# Patient Record
Sex: FEMALE
[De-identification: names, ages, dates, MRNs, and addresses within clinical notes are randomized per-mention and may not be internally consistent; named-entity substitution may affect disease eponyms.]

---

## 2019-03-16 ENCOUNTER — HOSPITAL ENCOUNTER (EMERGENCY)
Dept: HOSPITAL 97 - ER | Age: 34
Discharge: HOME | End: 2019-03-16
Payer: COMMERCIAL

## 2019-03-16 DIAGNOSIS — K52.9: Primary | ICD-10-CM

## 2019-03-16 LAB
ALBUMIN SERPL BCP-MCNC: 3.8 G/DL (ref 3.4–5)
ALP SERPL-CCNC: 54 U/L (ref 45–117)
ALT SERPL W P-5'-P-CCNC: 21 U/L (ref 12–78)
AST SERPL W P-5'-P-CCNC: 13 U/L (ref 15–37)
BUN BLD-MCNC: 5 MG/DL (ref 7–18)
GLUCOSE SERPLBLD-MCNC: 97 MG/DL (ref 74–106)
HCT VFR BLD CALC: 36.8 % (ref 36–45)
LIPASE SERPL-CCNC: 101 U/L (ref 73–393)
LYMPHOCYTES # SPEC AUTO: 1.4 K/UL (ref 0.7–4.9)
PMV BLD: 8.5 FL (ref 7.6–11.3)
POTASSIUM SERPL-SCNC: 3.4 MMOL/L (ref 3.5–5.1)
RBC # BLD: 4.01 M/UL (ref 3.86–4.86)

## 2019-03-16 PROCEDURE — 96365 THER/PROPH/DIAG IV INF INIT: CPT

## 2019-03-16 PROCEDURE — 81003 URINALYSIS AUTO W/O SCOPE: CPT

## 2019-03-16 PROCEDURE — 80048 BASIC METABOLIC PNL TOTAL CA: CPT

## 2019-03-16 PROCEDURE — 96375 TX/PRO/DX INJ NEW DRUG ADDON: CPT

## 2019-03-16 PROCEDURE — 83690 ASSAY OF LIPASE: CPT

## 2019-03-16 PROCEDURE — 99284 EMERGENCY DEPT VISIT MOD MDM: CPT

## 2019-03-16 PROCEDURE — 96361 HYDRATE IV INFUSION ADD-ON: CPT

## 2019-03-16 PROCEDURE — 74177 CT ABD & PELVIS W/CONTRAST: CPT

## 2019-03-16 PROCEDURE — 85025 COMPLETE CBC W/AUTO DIFF WBC: CPT

## 2019-03-16 PROCEDURE — 36415 COLL VENOUS BLD VENIPUNCTURE: CPT

## 2019-03-16 PROCEDURE — 81025 URINE PREGNANCY TEST: CPT

## 2019-03-16 PROCEDURE — 80076 HEPATIC FUNCTION PANEL: CPT

## 2019-03-16 NOTE — RAD REPORT
EXAM DESCRIPTION:  CTAbdomen   Pelvis W Contrast - 3/16/2019 2:34 pm

 

CLINICAL HISTORY:  Abdominal pain.

IV ONLY, abdominal pain, bloody stools

 

COMPARISON:  No comparisons

 

TECHNIQUE:  Biphasic CT imaging of the abdomen and pelvis was performed with 100 ml non-ionic IV cont
rast.

 

All CT scans are performed using dose optimization technique as appropriate and may include automated
 exposure control or mA/KV adjustment according to patient size.

 

FINDINGS:  The lung bases are clear.

 

The liver contains an 8 mm hypodensity in the left lobe, likely benign. The spleen, pancreas, adrenal
 glands and kidneys are within normal limits.

 

No bowel obstruction, free air, intra-abdominal free fluid or abscess. Moderately severe inflammatory
 wall thickening and edema is seen involving the ascending colon. No pneumatosis coli seen. Small sara
unt of free fluid is seen in the pelvis. The appendix is normal.  No evidence of significant lymphade
nopathy.

 

No suspicious bony findings.

 

IMPRESSION:  Moderately severe ascending colitis.

## 2019-03-16 NOTE — EDPHYS
Physician Documentation                                                                           

 [facility]                                                                

Name: Estela Allen                                                                       

Age: 33 yrs                                                                                       

Sex: Female                                                                                       

: 1985                                                                                   

MRN: [id number]                                                                                   

Arrival Date: 2019                                                                          

Time: 12:16                                                                                       

Account#: [id number]                                                                            

Bed 5                                                                                             

Private MD:                                                                                       

ED Physician Reese Damon                                                                         

HPI:                                                                                              

                                                                                             

13:26 This 33 yrs old  Female presents to ER via Ambulatory with complaints of Bloody jmm 

      Stools, Abdominal Cramping.                                                                 

13:26 The patient presents with abdominal pain.                                               jmm 

13:26 Onset: The symptoms/episode began/occurred gradually, 1 day(s) ago.                     jmm 

13:26 The symptoms do not radiate. Associated signs and symptoms: Pertinent positives: blood  jmm 

      in stools. This is a 33 year old female with no chronic medical conditions that             

      presents to the ED with complaints of abdominal pain, a blood in her stools beginning       

      approx 1 day ago. Patient states she recently returned on a trip from [place]. Denies        

      recent antibiotics use.                                                                     

                                                                                                  

OB/GYN:                                                                                           

12:29 LMP 3/5/2019                                                                            aa5 

                                                                                                  

Historical:                                                                                       

- Allergies:                                                                                      

12:29 No Known Allergies;                                                                     aa5 

- PMHx:                                                                                           

12:29 None;                                                                                   aa5 

- PSHx:                                                                                           

12:29 None;                                                                                   aa5 

                                                                                                  

- Immunization history:: Flu vaccine is up to date.                                               

- Social history:: Smoking status: Patient/guardian denies using tobacco.                         

- Ebola Screening: : No symptoms or risks identified at this time Patient reports                 

  travel to an Ebola-affected area in the 21 days before illness onset. Patient reports           

  to have traveled to [place] from  the  to Monday the .                             

                                                                                                  

                                                                                                  

ROS:                                                                                              

13:26 Constitutional: Negative for fever, chills, and weight loss, Cardiovascular: Negative   jmm 

      for chest pain, palpitations, and edema, Respiratory: Negative for shortness of breath,     

      cough, wheezing, and pleuritic chest pain.                                                  

13:26 Abdomen/GI: Positive for abdominal pain, diarrhea, rectal bleeding.                         

13:26 All other systems are negative.                                                             

                                                                                                  

Exam:                                                                                             

13:26 Constitutional:  This is a well developed, well nourished patient who is awake, alert,  jmm 

      and in no acute distress. Head/Face:  atraumatic. Eyes:  EOMI, no conjunctival erythema     

      appreciated ENT:  Moist Mucus Membranes Neck:  Trachea midline, Supple Chest/axilla:        

      Normal chest wall appearance and motion.   Cardiovascular:  Regular rate and rhythm.        

      No edema appreciated Respiratory:  Normal respirations, no respiratory distress             

      appreciated                                                                                 

13:26 Neuro:  Awake and alert, normal gait                                                        

13:26 Abdomen/GI: Inspection: abdomen appears normal, Bowel sounds: normal, Palpation: soft,      

      moderate abdominal tenderness, in the right upper quadrant and right lower quadrant.        

13:26 Back: ROM is normal.                                                                        

13:26 Musculoskeletal/extremity: ROM: intact in all extremities.                                  

13:26 Skin: Appearance: Color: normal in color.                                                   

13:26 Psych: Behavior/mood is pleasant, cooperative.                                              

                                                                                                  

Vital Signs:                                                                                      

12:29  / 82; Pulse 102; Resp 18 S; Temp 98.7(TE); Pulse Ox 99% on R/A; Weight 48.08 kg  aa5 

      (R); Height 5 ft. 1 in. (154.94 cm) (R); Pain 8/10;                                         

13:21  / 78; Pulse 95; Resp 14; Pulse Ox 100% on R/A; Pain 3/10;                        pc1 

16:00  / 75; Pulse 77; Resp 18; Pulse Ox 100% on R/A;                                   pc1 

17:57  / 76; Pulse 72; Resp 18; Pulse Ox 100% on R/A;                                   hj  

12:29 Body Mass Index 20.03 (48.08 kg, 154.94 cm)                                             aa5 

                                                                                                  

MDM:                                                                                              

13:26 Patient medically screened.                                                             [facility] 

17:50 Data reviewed: vital signs, nurses notes. Counseling: I had a detailed discussion with  lashell 

      the patient and/or guardian regarding: the historical points, exam findings, and any        

      diagnostic results supporting the discharge/admit diagnosis, lab results, radiology         

      results, the need for outpatient follow up, to return to the emergency department if        

      symptoms worsen or persist or if there are any questions or concerns that arise at          

      home. ED course: Patient advised to follow up with gi or pcp for reevaluation. patient      

      is given strict return precautions. patient understood and agrees with the plan of          

      care. .                                                                                     

                                                                                                  

                                                                                             

13:30 Order name: Basic Metabolic Panel; Complete Time: 14:20                                 [facility] 

                                                                                             

13:30 Order name: CBC with Diff; Complete Time: 14:20                                         [facility] 

                                                                                             

13:30 Order name: Creatinine for Radiology; Complete Time: 14:20                              [facility] 

                                                                                             

13:30 Order name: Hepatic Function; Complete Time: 14:20                                      [facility] 

                                                                                             

13:30 Order name: Lipase; Complete Time: 14:20                                                [facility] 

                                                                                             

13:34 Order name: Urine Dipstick--Ancillary (enter results); Complete Time: 13:44               

                                                                                             

13:30 Order name: CT Abd/Pelvis - W/Contrast; Complete Time: 14:49                            [facility] 

                                                                                             

13:34 Order name: Urine Pregnancy--Ancillary (enter results); Complete Time: 13:44              

                                                                                             

13:30 Order name: IV Saline Lock; Complete Time: 13:47                                        [facility] 

                                                                                             

13:30 Order name: Labs collected and sent; Complete Time: 13:58                               [facility] 

                                                                                                  

Administered Medications:                                                                         

13:40 Drug: NS 0.9% 1000 ml Route: IV; Rate: 1 bolus; Site: right antecubital;                hj  

15:30 Follow up: IV Status: Completed infusion                                                hj  

13:40 Drug: morphine 2 mg Route: IVP; Site: right antecubital;                                hj  

15:00 Follow up: Response: No adverse reaction                                                hj  

13:40 Drug: Zofran 4 mg Route: IVP; Site: right antecubital;                                  hj  

15:00 Follow up: Response: No adverse reaction; Pain is decreased                             hj  

14:53 Drug: Flagyl 500 mg Volume: 100 ml; Route: IVPB; Rate: 200 ml/hr; Infused Over: 30      hj  

      mins; Site: right antecubital;                                                              

16:00 Follow up: IV Status: Completed infusion                                                hj  

15:35 Drug: LevaQUIN 750 mg Volume: 150 ml; Route: IVPB; Infused Over: 90 mins; Site: right   hj  

      antecubital;                                                                                

16:10 Follow up: IV Status: Completed infusion                                                hj  

16:41 Drug: morphine 2 mg Route: IVP; Site: right antecubital;                                hj  

16:46 Follow up: Response: No adverse reaction; Pain is decreased                             hj  

                                                                                                  

                                                                                                  

Disposition:                                                                                      

                                                                                             

09:44 Co-signature as Attending Physician, Reese Damon MD.                                    rn  

                                                                                                  

Disposition:                                                                                      

19 17:51 Discharged to Home. Impression: Colitis.                                           

- Condition is Stable.                                                                            

- Discharge Instructions: Colitis.                                                                

- Prescriptions for Flagyl 500 mg Oral Tablet - take 1 tablet by ORAL route every 8               

  hours for 10 days; 30 tablet. Levaquin 750 mg Oral Tablet - take 1 tablet by ORAL               

  route once daily for 10 days; 10 tablet. Tylenol- Codeine #3 300-30 mg Oral Tablet -            

  take 1 tablet by ORAL route every 6 hours As needed; 20 tablet.                                 

- Medication Reconciliation Form, Thank You Letter, Antibiotic Education, Prescription            

  Opioid Use form.                                                                                

- Follow up: Gregg Bar MD; When: 2 - 3 days; Reason: Recheck today's complaints,           

  Continuance of care, Re-evaluation by your physician.                                           

                                                                                                  

                                                                                                  

                                                                                                  

Signatures:                                                                                       

Dispatcher [employer]                           EDMS                                                 

Juan Palbo Diaz PA PA jmm Nieto, Roman, MD MD   rn                                                   

Nikky Newman RN                     RN   aa5                                                  

Moses Foss, RN                      RN   hj Cantu, Patrick pc1                                                  

                                                                                                  

Corrections: (The following items were deleted from the chart)                                    

                                                                                             

18:00 17:51 2019 17:51 Discharged to Home. Impression: Colitis. Condition is Stable.    hj  

      Forms are Medication Reconciliation Form, Thank You Letter, Antibiotic Education,           

      Prescription Opioid Use. Follow up: Gregg Bar; When: 2 - 3 days; Reason: Recheck       

      today's complaints, Continuance of care, Re-evaluation by your physician. lashell               

                                                                                                  

**************************************************************************************************

## 2019-03-16 NOTE — ER
Nurse's Notes                                                                                     

 [facility]                                                                

Name: Estela Allen                                                                       

Age: 33 yrs                                                                                       

Sex: Female                                                                                       

: 1985                                                                                   

MRN: [id number]                                                                                   

Arrival Date: 2019                                                                          

Time: 12:16                                                                                       

Account#: [id number]                                                                            

Bed 5                                                                                             

Private MD:                                                                                       

Diagnosis: Colitis                                                                                

                                                                                                  

Presentation:                                                                                     

                                                                                             

12:28 Presenting complaint: Patient states: upper abd cramping that radiates to lower abdomen aa5 

      since yesterday. Pt reports nausea and diarrhea with dark red blood that began              

      yesterday. Pt denies vomiting. Transition of care: patient was not received from            

      another setting of care. Onset of symptoms was 2019. Risk Assessment: Do you want     

      to hurt yourself or someone else? Patient reports no desire to harm self or others.         

      Initial Sepsis Screen: Does the patient meet any 2 criteria? No. Patient's initial          

      sepsis screen is negative. Does the patient have a suspected source of infection? No.       

      Patient's initial sepsis screen is negative. Care prior to arrival: None.                   

12:28 Method Of Arrival: Ambulatory                                                           aa5 

12:28 Acuity: [place]                                                                           aa5 

13:24 Care prior to arrival: Medication(s) given: 200mg Ibuprofen at home.                    pc1 

                                                                                                  

Triage Assessment:                                                                                

13:14 General: Appears in no apparent distress. uncomfortable, Behavior is calm, cooperative, hj  

      appropriate for age. Pain: Complains of pain in abdomen. GI: Reports lower abdominal        

      pain, upper abdominal pain, bloody stool, nausea, vomiting.                                 

                                                                                                  

OB/GYN:                                                                                           

12:29 LMP 3/5/2019                                                                            aa5 

                                                                                                  

Historical:                                                                                       

- Allergies:                                                                                      

12:29 No Known Allergies;                                                                     aa5 

- PMHx:                                                                                           

12:29 None;                                                                                   aa5 

- PSHx:                                                                                           

12:29 None;                                                                                   aa5 

                                                                                                  

- Immunization history:: Flu vaccine is up to date.                                               

- Social history:: Smoking status: Patient/guardian denies using tobacco.                         

- Ebola Screening: : No symptoms or risks identified at this time Patient reports                 

  travel to an Ebola-affected area in the 21 days before illness onset. Patient reports           

  to have traveled to [place] from  the  to Monday the .                             

                                                                                                  

                                                                                                  

Screenin:14 Abuse screen: Denies threats or abuse. Denies injuries from another. Nutritional        hj  

      screening: No deficits noted. Tuberculosis screening: No symptoms or risk factors           

      identified. Fall Risk None identified.                                                      

                                                                                                  

Assessment:                                                                                       

13:15 General: Appears in no apparent distress. uncomfortable, Behavior is calm, cooperative, hj  

      appropriate for age. Pain: Complains of pain in abdomen. Neuro: Level of Consciousness      

      is awake, alert, obeys commands, Oriented to person, place, time, situation,                

      Appropriate for age. Cardiovascular: Capillary refill < 3 seconds Patient's skin is         

      warm and dry. Respiratory: Airway is patent Respiratory effort is even, unlabored,          

      Respiratory pattern is regular, symmetrical. GI: Reports lower abdominal pain, upper        

      abdominal pain, bloody stool, nausea, vomiting. : No signs and/or symptoms were           

      reported regarding the genitourinary system. EENT: No signs and/or symptoms were            

      reported regarding the EENT system. Derm: No signs and/or symptoms reported regarding       

      the dermatologic system. Musculoskeletal: No signs and/or symptoms reported regarding       

      the musculoskeletal system.                                                                 

14:30 Reassessment: Patient and/or family updated on plan of care and expected duration. Pain hj  

      level reassessed. Patient is alert, oriented x 3, equal unlabored respirations, skin        

      warm/dry/pink. awaiting results and POC:.                                                   

15:30 Reassessment: Patient and/or family updated on plan of care and expected duration. Pain hj  

      level reassessed. Patient is alert, oriented x 3, equal unlabored respirations, skin        

      warm/dry/pink. Patient states feeling better.                                               

16:48 Reassessment: Patient and/or family updated on plan of care and expected duration. Pain hj  

      level reassessed. Patient is alert, oriented x 3, equal unlabored respirations, skin        

      warm/dry/pink. medicated.                                                                   

                                                                                                  

Vital Signs:                                                                                      

12:29  / 82; Pulse 102; Resp 18 S; Temp 98.7(TE); Pulse Ox 99% on R/A; Weight 48.08 kg  aa5 

      (R); Height 5 ft. 1 in. (154.94 cm) (R); Pain 8/10;                                         

13:21  / 78; Pulse 95; Resp 14; Pulse Ox 100% on R/A; Pain 3/10;                        pc1 

16:00  / 75; Pulse 77; Resp 18; Pulse Ox 100% on R/A;                                   pc1 

17:57  / 76; Pulse 72; Resp 18; Pulse Ox 100% on R/A;                                   hj  

12:29 Body Mass Index 20.03 (48.08 kg, 154.94 cm)                                             aa5 

                                                                                                  

ED Course:                                                                                        

12:16 Patient arrived in ED.                                                                  mr  

12:28 Arm band placed on.                                                                     aa5 

12:29 Triage completed.                                                                       aa 

13:12 Moses Foss, RN is Primary Nurse.                                                    hj  

13:15 Patient has correct armband on for positive identification. Placed in gown. Bed in low  hj  

      position. Call light in reach. Side rails up X 1. Adult w/ patient.                         

13:17 Juan Pablo Diaz PA is PHCP.                                                              [facility] 

13:17 Reese Damon MD is Attending Physician.                                                [facility] 

13:40 Initial lab(s) drawn, by me, sent to lab. Inserted saline lock: 22 gauge in right       hj  

      antecubital area, using aseptic technique. Blood collected.                                 

14:33 CT completed. Patient tolerated procedure well. Patient moved back from CT.             bq  

14:34 CT Abd/Pelvis - W/Contrast In Process Unspecified.                                      EDMS

17:51 Gregg Bar MD is Referral Physician.                                              [facility] 

17:56 No provider procedures requiring assistance completed. IV discontinued, intact,         hj  

      bleeding controlled, No redness/swelling at site. Pressure dressing applied.                

                                                                                                  

Administered Medications:                                                                         

13:40 Drug: NS 0.9% 1000 ml Route: IV; Rate: 1 bolus; Site: right antecubital;                hj  

15:30 Follow up: IV Status: Completed infusion                                                hj  

13:40 Drug: morphine 2 mg Route: IVP; Site: right antecubital;                                hj  

15:00 Follow up: Response: No adverse reaction                                                hj  

13:40 Drug: Zofran 4 mg Route: IVP; Site: right antecubital;                                  hj  

15:00 Follow up: Response: No adverse reaction; Pain is decreased                             hj  

14:53 Drug: Flagyl 500 mg Volume: 100 ml; Route: IVPB; Rate: 200 ml/hr; Infused Over: 30      hj  

      mins; Site: right antecubital;                                                              

16:00 Follow up: IV Status: Completed infusion                                                hj  

15:35 Drug: LevaQUIN 750 mg Volume: 150 ml; Route: IVPB; Infused Over: 90 mins; Site: right   hj  

      antecubital;                                                                                

16:10 Follow up: IV Status: Completed infusion                                                hj  

16:41 Drug: morphine 2 mg Route: IVP; Site: right antecubital;                                hj  

16:46 Follow up: Response: No adverse reaction; Pain is decreased                               

                                                                                                  

                                                                                                  

Outcome:                                                                                          

17:51 Discharge ordered by MD.                                                                lashell 

17:56 Discharged to home ambulatory, with family.                                             bin  

17:56 Condition: stable                                                                           

17:56 Discharge instructions given to patient, family, Instructed on discharge instructions,      

      follow up and referral plans. medication usage, Demonstrated understanding of               

      instructions, follow-up care, medications, Prescriptions given X 2.                         

18:00 Patient left the ED.                                                                    bin  

                                                                                                  

Signatures:                                                                                       

Dispatcher [employer]                           EDMS                                                 

Juan Pablo Diaz PA PA jmm Rivera, Mary                                 mr                                                   

DrissdeisiBecky Audri, RN                     RN   aa5                                                  

Moses Foss RN                      RN   hj Cantu, Patrick                               pc1                                                  

                                                                                                  

**************************************************************************************************

## 2019-03-17 ENCOUNTER — HOSPITAL ENCOUNTER (OUTPATIENT)
Dept: HOSPITAL 97 - ER | Age: 34
Setting detail: OBSERVATION
LOS: 2 days | Discharge: HOME | End: 2019-03-19
Attending: HOSPITALIST | Admitting: HOSPITALIST
Payer: COMMERCIAL

## 2019-03-17 DIAGNOSIS — K52.9: Primary | ICD-10-CM

## 2019-03-17 DIAGNOSIS — K76.9: ICD-10-CM

## 2019-03-17 LAB
ALBUMIN SERPL BCP-MCNC: 4.1 G/DL (ref 3.4–5)
ALP SERPL-CCNC: 55 U/L (ref 45–117)
ALT SERPL W P-5'-P-CCNC: 20 U/L (ref 12–78)
AST SERPL W P-5'-P-CCNC: 12 U/L (ref 15–37)
BUN BLD-MCNC: 6 MG/DL (ref 7–18)
GLUCOSE SERPLBLD-MCNC: 108 MG/DL (ref 74–106)
HCT VFR BLD CALC: 41.2 % (ref 36–45)
LIPASE SERPL-CCNC: 100 U/L (ref 73–393)
LYMPHOCYTES # SPEC AUTO: 0.7 K/UL (ref 0.7–4.9)
PMV BLD: 8.3 FL (ref 7.6–11.3)
POTASSIUM SERPL-SCNC: 3.3 MMOL/L (ref 3.5–5.1)
RBC # BLD: 4.5 M/UL (ref 3.86–4.86)

## 2019-03-17 PROCEDURE — 96361 HYDRATE IV INFUSION ADD-ON: CPT

## 2019-03-17 PROCEDURE — 89055 LEUKOCYTE ASSESSMENT FECAL: CPT

## 2019-03-17 PROCEDURE — 80048 BASIC METABOLIC PNL TOTAL CA: CPT

## 2019-03-17 PROCEDURE — 83690 ASSAY OF LIPASE: CPT

## 2019-03-17 PROCEDURE — 36415 COLL VENOUS BLD VENIPUNCTURE: CPT

## 2019-03-17 PROCEDURE — 96365 THER/PROPH/DIAG IV INF INIT: CPT

## 2019-03-17 PROCEDURE — 81025 URINE PREGNANCY TEST: CPT

## 2019-03-17 PROCEDURE — 87045 FECES CULTURE AEROBIC BACT: CPT

## 2019-03-17 PROCEDURE — 80076 HEPATIC FUNCTION PANEL: CPT

## 2019-03-17 PROCEDURE — 80053 COMPREHEN METABOLIC PANEL: CPT

## 2019-03-17 PROCEDURE — 81003 URINALYSIS AUTO W/O SCOPE: CPT

## 2019-03-17 PROCEDURE — 87046 STOOL CULTR AEROBIC BACT EA: CPT

## 2019-03-17 PROCEDURE — 87088 URINE BACTERIA CULTURE: CPT

## 2019-03-17 PROCEDURE — 87493 C DIFF AMPLIFIED PROBE: CPT

## 2019-03-17 PROCEDURE — 99285 EMERGENCY DEPT VISIT HI MDM: CPT

## 2019-03-17 PROCEDURE — 87040 BLOOD CULTURE FOR BACTERIA: CPT

## 2019-03-17 PROCEDURE — 84703 CHORIONIC GONADOTROPIN ASSAY: CPT

## 2019-03-17 PROCEDURE — 85025 COMPLETE CBC W/AUTO DIFF WBC: CPT

## 2019-03-17 PROCEDURE — 84100 ASSAY OF PHOSPHORUS: CPT

## 2019-03-17 PROCEDURE — 96375 TX/PRO/DX INJ NEW DRUG ADDON: CPT

## 2019-03-17 PROCEDURE — 87086 URINE CULTURE/COLONY COUNT: CPT

## 2019-03-17 PROCEDURE — 83735 ASSAY OF MAGNESIUM: CPT

## 2019-03-17 PROCEDURE — 80074 ACUTE HEPATITIS PANEL: CPT

## 2019-03-17 PROCEDURE — 81015 MICROSCOPIC EXAM OF URINE: CPT

## 2019-03-17 NOTE — ER
Nurse's Notes                                                                                     

 [facility]                                                                

Name: Estela Allen                                                                       

Age: 33 yrs                                                                                       

Sex: Female                                                                                       

: 1985                                                                                   

MRN: [id number]                                                                                   

Arrival Date: 2019                                                                          

Time: 21:40                                                                                       

Account#: [id number]                                                                            

Bed 28                                                                                            

Private MD:                                                                                       

Diagnosis: Left sided colitis with complications-failed outpt therapy;Dehydration                 

                                                                                                  

Presentation:                                                                                     

                                                                                             

22:06 Presenting complaint: Patient states: was seen here yesterday and diagnosed with        bb  

      Colitis but today is vomiting and can't keep anything down, still having abdominal          

      pain. Transition of care: patient was not received from another setting of care. Onset      

      of symptoms was March 15, 2019. Risk Assessment: Do you want to hurt yourself or            

      someone else? Patient reports no desire to harm self or others. Initial Sepsis Screen:      

      Does the patient meet any 2 criteria? No. Patient's initial sepsis screen is negative.      

      Does the patient have a suspected source of infection? No. Patient's initial sepsis         

      screen is negative. Care prior to arrival: None.                                            

22:06 Method Of Arrival: Ambulatory                                                           bb  

22:06 Acuity: NOLA 3                                                                           bb  

                                                                                                  

OB/GYN:                                                                                           

22:08 LMP 2019                                                                           bb  

                                                                                                  

Historical:                                                                                       

- Allergies:                                                                                      

22:08 No Known Allergies;                                                                     bb  

- Home Meds:                                                                                      

22:08 birth control pill [Active];                                                            bb  

- PMHx:                                                                                           

22:08 colitis;                                                                                bb  

- PSHx:                                                                                           

22:08 None;                                                                                   bb  

                                                                                                  

- Immunization history:: Adult Immunizations up to date.                                          

- Social history:: Smoking status: Patient/guardian denies using tobacco.                         

- Ebola Screening: : No symptoms or risks identified at this time.                                

                                                                                                  

                                                                                                  

Screenin:39 Abuse screen: Denies threats or abuse. Nutritional screening: No deficits noted.        la1 

      Tuberculosis screening: No symptoms or risk factors identified. Fall Risk None              

      identified.                                                                                 

                                                                                                  

Assessment:                                                                                       

22:38 General: Appears in no apparent distress. Behavior is calm, cooperative. Pain:          la1 

      Complains of pain in right upper quadrant, left upper quadrant, right lower quadrant        

      and left lower quadrant. Neuro: Level of Consciousness is awake, alert, obeys commands,     

      Oriented to person, place, time, situation. Cardiovascular: Capillary refill < 3            

      seconds Patient's skin is warm and dry. Respiratory: Airway is patent Respiratory           

      effort is even, unlabored, Respiratory pattern is regular, symmetrical, Breath sounds       

      are clear bilaterally. GI: Abdomen is round non-distended, Bowel sounds present X 4         

      quads. Abd is soft X 4 quads Reports diarrhea, nausea, vomiting. : No signs and/or        

      symptoms were reported regarding the genitourinary system.                                  

                                                                                                  

Vital Signs:                                                                                      

22:08  / 76; Pulse 98; Resp 16 S; Temp 98.2(O); Pulse Ox 99% on R/A; Weight 48.08 kg    bb  

      (R); Height 5 ft. 1 in. (154.94 cm) (R); Pain 4/10;                                         

23:38  / 77; Pulse 87; Resp 18; Pulse Ox 98% on R/A;                                    la1 

                                                                                             

00:56  / 71; Pulse 85; Resp 18; Pulse Ox 98% on R/A;                                    la1 

                                                                                             

22:08 Body Mass Index 20.03 (48.08 kg, 154.94 cm)                                             bb  

                                                                                                  

ED Course:                                                                                        

                                                                                             

21:40 Patient arrived in ED.                                                                  am2 

22:05 [facility]                                                        snw 

22:05 Quintin Flores MD is Attending Physician.                                              snw 

22:08 Triage completed.                                                                       bb  

22:08 Arm band placed on Patient placed in waiting room, Patient notified of wait time.       bb  

      Family accompanied patient.                                                                 

22:29 Tom Acosta, RN is Primary Nurse.                                                       la1 

22:30 Placed in gown. Bed in low position. Call light in reach. Side rails up X 1. Side rails jp3 

      up X2. Warm blanket given. Pillow given.                                                    

22:30 Pulse ox on. NIBP on.                                                                   jp3 

22:30 Urine collected: clean catch specimen, clear, gayle colored, Amount Voided: 100mL.      jp3 

22:45 Initial lab(s) drawn, by me, sent to lab. Inserted saline lock: 22 gauge in left        jp3 

      antecubital area, using aseptic technique. Blood collected.                                 

22:45 First set of blood cultures drawn via 22-gauge IV in LAC.                               jp3 

23:00 Second set of blood cultures drawn via 21-gauge butterfly needle in RAC.                jp3 

23:24 Jessie Rose MD is Hospitalizing Provider.                                           snw 

                                                                                             

01:32 No provider procedures requiring assistance completed. Patient admitted, IV remains in  la1 

      place.                                                                                      

                                                                                                  

Administered Medications:                                                                         

                                                                                             

22:54 Drug: NS 0.9% 1000 ml Route: IV; Rate: 1 bolus; Site: left antecubital;                 la1 

23:37 Follow up: IV Status: Completed infusion                                                la1 

                                                                                             

00:57 Follow up: IV Status: Completed infusion                                                la1 

                                                                                             

23:00 CANCELLED (no fentanyl in hosp): fentaNYL (PF) 25 mcg IVP once                          snw 

23:14 Drug: Phenergan 12.5 mg Route: IVP; Site: left antecubital;                             la1 

23:38 Follow up: Response: No adverse reaction                                                la1 

23:14 Drug: morphine 2 mg Route: IVP; Site: left antecubital;                                 la1 

                                                                                             

00:57 Follow up: Response: No adverse reaction; Pain is decreased                             la1 

                                                                                             

23:37 Drug: Cipro 400 mg Volume: 200 ml; Route: IVPB; Infused Over: 60 mins; Site: left       la1 

      antecubital;                                                                                

                                                                                             

00:56 Follow up: IV Status: Completed infusion                                                la1 

01:08 Drug: Zofran 4 mg Route: IVP; Site: left antecubital;                                   la1 

01:09 Follow up: Response: No adverse reaction                                                la1 

                                                                                                  

                                                                                                  

Outcome:                                                                                          

                                                                                             

23:25 Decision to Hospitalize by Provider.                                                    snw 

                                                                                             

01:32 Admitted to Tele accompanied by nurse, via wheelchair, room 417.                        la1 

      Condition: stable                                                                           

      Instructed on the need for admit.                                                           

01:32 Patient left the ED.                                                                    la1 

                                                                                                  

Signatures:                                                                                       

Corrina Dunbar, TENISHAP-C                 FNP-Csnw                                                  

Domitila Dickson RN RN bb Attema, Lee, RN RN   la1                                                  

Clara Hollis Jacob jp3                                                  

                                                                                                  

**************************************************************************************************

## 2019-03-17 NOTE — EDPHYS
Physician Documentation                                                                           

 [facility]                                                                

Name: Estela Allen                                                                       

Age: 33 yrs                                                                                       

Sex: Female                                                                                       

: 1985                                                                                   

MRN: [id number]                                                                                   

Arrival Date: 2019                                                                          

Time: 21:40                                                                                       

Account#: [id number]                                                                            

Bed 28                                                                                            

Private MD:                                                                                       

ED Physician Quintin Flores                                                                       

HPI:                                                                                              

                                                                                             

22:47 This 33 yrs old  Female presents to ER via Ambulatory with complaints of        snw 

      Abdominal Pain, Nausea/Vomiting.                                                            

22:47 The patient presents with abdominal pain that is diffuse. Associated signs and          snw 

      symptoms: Pertinent positives: diarrhea, vomiting. The symptoms are described as            

      constant. Modifying factors: The symptoms are alleviated by nothing. Severity of pain:      

      At its worst the pain was moderate. The patient has experienced a previous episode. The     

      patient has been recently seen by a physician: The patient has been recently seen at        

      the [facility] Emergency Department, yesterday, for similar          

      complaints pt took her medications this am and this evening but began vomiting. +           

      continued continuous pain.                                                                  

                                                                                                  

OB/GYN:                                                                                           

22:08 LMP 2019                                                                           bb  

                                                                                                  

Historical:                                                                                       

- Allergies:                                                                                      

22:08 No Known Allergies;                                                                     bb  

- Home Meds:                                                                                      

22:08 birth control pill [Active];                                                            bb  

- PMHx:                                                                                           

22:08 colitis;                                                                                bb  

- PSHx:                                                                                           

22:08 None;                                                                                   bb  

                                                                                                  

- Immunization history:: Adult Immunizations up to date.                                          

- Social history:: Smoking status: Patient/guardian denies using tobacco.                         

- Ebola Screening: : No symptoms or risks identified at this time.                                

                                                                                                  

                                                                                                  

ROS:                                                                                              

22:46 Eyes: Negative for injury, pain, redness, and discharge, ENT: Negative for injury,      snw 

      pain, and discharge, Neck: Negative for injury, pain, and swelling, Cardiovascular:         

      Negative for chest pain, palpitations, and edema, Respiratory: Negative for shortness       

      of breath, cough, wheezing, and pleuritic chest pain.                                       

22:46 Back: Negative for injury and pain, : Negative for injury, bleeding, discharge, and       

      swelling, MS/Extremity: Negative for injury and deformity, Skin: Negative for injury,       

      rash, and discoloration, Neuro: Negative for headache, weakness, numbness, tingling,        

      and seizure.                                                                                

22:46 Constitutional: Positive for body aches, fatigue, malaise, poor PO intake.                  

22:46 Abdomen/GI: Positive for abdominal pain, nausea, vomiting, and diarrhea.                    

                                                                                                  

Exam:                                                                                             

22:45 Constitutional:  This is a well developed, thin patient who is awake, alert, and in     snw 

      mild acute distress. Head/Face:  Normocephalic, atraumatic. Eyes:  Pupils equal round       

      and reactive to light, extra-ocular motions intact.  Lids and lashes normal.                

      Conjunctiva and sclera are non-icteric and not injected.  Cornea within normal limits.      

      Periorbital areas with no swelling, redness, or edema. ENT:  Nares patent. No nasal         

      discharge, no septal abnormalities noted.  Tympanic membranes are normal and external       

      auditory canals are clear.  Oropharynx with no redness, swelling, or masses, exudates,      

      or evidence of obstruction, uvula midline.  Mucous membranes moist. Neck:  Trachea          

      midline, no thyromegaly or masses palpated, and no cervical lymphadenopathy.  Supple,       

      full range of motion without nuchal rigidity, or vertebral point tenderness.  No            

      Meningismus. Chest/axilla:  Normal chest wall appearance and motion.  Nontender with no     

      deformity.  No lesions are appreciated. Cardiovascular:  Regular rate and rhythm with a     

      normal S1 and S2.  No gallops, murmurs, or rubs.  Normal PMI, no JVD.  No pulse             

      deficits. Respiratory:  Lungs have equal breath sounds bilaterally, clear to                

      auscultation and percussion.  No rales, rhonchi or wheezes noted.  No increased work of     

      breathing, no retractions or nasal flaring. Back:  No spinal tenderness.  No                

      costovertebral tenderness.  Full range of motion. Skin:  Warm, dry with normal turgor.      

      Normal color with no rashes, no lesions, and no evidence of cellulitis. MS/ Extremity:      

      Pulses equal, no cyanosis.  Neurovascular intact.  Full, normal range of motion. Neuro:     

       Awake and alert, GCS 15, oriented to person, place, time, and situation.  Cranial          

      nerves II-XII grossly intact.  Motor strength 5/5 in all extremities.  Sensory grossly      

      intact.  Cerebellar exam normal.  Normal gait. Psych:  Awake, alert, with orientation       

      to person, place and time.  Behavior, mood, and affect are within normal limits.            

22:45 Abdomen/GI: Inspection: scaphoid, Bowel sounds: active, Palpation: moderate abdominal       

      tenderness, in all quadrants.                                                               

                                                                                                  

Vital Signs:                                                                                      

22:08  / 76; Pulse 98; Resp 16 S; Temp 98.2(O); Pulse Ox 99% on R/A; Weight 48.08 kg    bb  

      (R); Height 5 ft. 1 in. (154.94 cm) (R); Pain 4/10;                                         

23:38  / 77; Pulse 87; Resp 18; Pulse Ox 98% on R/A;                                    la1 

                                                                                             

00:56  / 71; Pulse 85; Resp 18; Pulse Ox 98% on R/A;                                    la1 

                                                                                             

22:08 Body Mass Index 20.03 (48.08 kg, 154.94 cm)                                             bb  

                                                                                                  

MDM:                                                                                              

                                                                                             

22:26 Patient medically screened.                                                             snw 

23:26 Data reviewed: vital signs, nurses notes. Data interpreted: Pulse oximetry: on room air snw 

      is 99 %. Interpretation: normal. Counseling: I had a detailed discussion with the           

      patient and/or guardian regarding: the historical points, exam findings, and any            

      diagnostic results supporting the discharge/admit diagnosis, lab results, radiology         

      results, the need for further work-up and treatment in the hospital. Physician              

      consultation: Jessie Rose MD was called at 23:27, was contacted at 23:27, regarding       

      admission, to the telemetry unit.                                                           

23:27 ED course: Pt here at [facility] ED yesterday, dx colitis on CT, pt unable to tolerate po      snw 

      medications today with dehydration, vomiting, and generalized abdominal pain.               

                                                                                                  

                                                                                             

22:27 Order name: Basic Metabolic Panel; Complete Time: 23:24                                 snw 

                                                                                             

22:27 Order name: CBC with Diff; Complete Time: 01:23                                         snw 

                                                                                             

22:27 Order name: Hepatic Function; Complete Time: 23:24                                      snw 

                                                                                             

22:27 Order name: Lipase; Complete Time: 23:24                                                snw 

                                                                                             

22:27 Order name: Blood Culture Adult (2)                                                     w 

                                                                                             

22:27 Order name: Pregnancy Test, Serum; Complete Time: 00:54                                 snw 

                                                                                             

22:27 Order name: Urine Culture                                                               w 

                                                                                             

22:27 Order name: Urine Microscopic Only; Complete Time: 01:23                                snw 

                                                                                             

22:43 Order name: Urine Dipstick--Ancillary (enter results); Complete Time: 23:04             mw2 

                                                                                             

22:43 Order name: Urine Pregnancy--Ancillary (enter results); Complete Time: 23:04            mw2 

                                                                                             

23:15 Order name: Manual Differential; Complete Time: 01:23                                   EDMS

                                                                                             

22:27 Order name: IV Saline Lock; Complete Time: 22:54                                        snw 

                                                                                             

22:27 Order name: Labs collected and sent; Complete Time: 22:54                               snw 

                                                                                             

22:27 Order name: Urine Dipstick-Ancillary (obtain specimen); Complete Time: 22:41            snw 

                                                                                             

00:31 Order name: NPO                                                                         EDMS

                                                                                                  

Administered Medications:                                                                         

22:54 Drug: NS 0.9% 1000 ml Route: IV; Rate: 1 bolus; Site: left antecubital;                 la1 

23:37 Follow up: IV Status: Completed infusion                                                la1 

                                                                                             

00:57 Follow up: IV Status: Completed infusion                                                la1 

                                                                                             

23:00 CANCELLED (no fentanyl in hosp): fentaNYL (PF) 25 mcg IVP once                          snw 

23:14 Drug: Phenergan 12.5 mg Route: IVP; Site: left antecubital;                             la1 

23:38 Follow up: Response: No adverse reaction                                                la1 

23:14 Drug: morphine 2 mg Route: IVP; Site: left antecubital;                                 la1 

                                                                                             

00:57 Follow up: Response: No adverse reaction; Pain is decreased                             la1 

                                                                                             

23:37 Drug: Cipro 400 mg Volume: 200 ml; Route: IVPB; Infused Over: 60 mins; Site: left       la1 

      antecubital;                                                                                

                                                                                             

00:56 Follow up: IV Status: Completed infusion                                                la1 

01:08 Drug: Zofran 4 mg Route: IVP; Site: left antecubital;                                   la1 

01:09 Follow up: Response: No adverse reaction                                                la1 

                                                                                                  

                                                                                                  

Disposition:                                                                                      

02:59 Co-signature as Attending Physician, Quintin Flores MD.                                  gs  

                                                                                                  

Disposition:                                                                                      

19 23:25 Hospitalization ordered by Jessie Rose for Inpatient Admission. Preliminary     

  diagnosis are Left sided colitis with complications - failed outpt therapy,                     

  Dehydration.                                                                                    

- Bed requested for Telemetry/MedSurg (Inpatient).                                                

- Status is Inpatient Admission.                                                              la1 

- Condition is Stable.                                                                            

- Problem is an acute exacerbation.                                                               

- Symptoms have worsened.                                                                         

UTI on Admission? No                                                                              

                                                                                                  

                                                                                                  

                                                                                                  

Signatures:                                                                                       

Dispatcher [employer]                           EDMS                                                 

Corrina Dunbar, ARLET-C                 FNP-Csnw                                                  

Domitila Dickson, ZACARIAS                     RN   bb                                                   

Tom Acosta RN                         RN   la1                                                  

Quintin Flores MD MD gs Tran, Leah                                   lt1                                                  

                                                                                                  

Corrections: (The following items were deleted from the chart)                                    

                                                                                             

23:00 22:57 fentaNYL (PF) 25 mcg IVP once ordered. snw                                        sn 

                                                                                             

00:40  23:25 Hospitalization Ordered by Jessie Rose MD for Inpatient Admission.        lt1 

      Preliminary diagnosis is Left sided colitis with complications - failed outpt therapy;      

      Dehydration. Bed requested for Telemetry/MedSurg (Inpatient). Status is Inpatient           

      Admission. Condition is Stable. Problem is an acute exacerbation. Symptoms have             

      worsened. UTI on Admission? No. [facility]                                                         

                                                                                             

01:32 00:40 2019 23:25 Hospitalization Ordered by Jessie Rose MD for Inpatient        la1 

      Admission. Preliminary diagnosis is Left sided colitis with complications - failed          

      outpt therapy; Dehydration. Bed requested for Telemetry/MedSurg (Inpatient). Status is      

      Inpatient Admission. Condition is Stable. Problem is an acute exacerbation. Symptoms        

      have worsened. UTI on Admission? No. lt1                                                    

                                                                                                  

**************************************************************************************************

## 2019-03-18 LAB
BUN BLD-MCNC: 5 MG/DL (ref 7–18)
CAOX CRY URNS QL MICRO: (no result)
GLUCOSE SERPLBLD-MCNC: 80 MG/DL (ref 74–106)
HCT VFR BLD CALC: 33.8 % (ref 36–45)
LYMPHOCYTES # SPEC AUTO: 1.6 K/UL (ref 0.7–4.9)
MAGNESIUM SERPL-MCNC: 1.7 MG/DL (ref 1.8–2.4)
MORPHOLOGY BLD-IMP: (no result)
PMV BLD: 8.1 FL (ref 7.6–11.3)
POTASSIUM SERPL-SCNC: 3.4 MMOL/L (ref 3.5–5.1)
RBC # BLD: 3.68 M/UL (ref 3.86–4.86)
UA COMPLETE W REFLEX CULTURE PNL UR: (no result)
UA DIPSTICK W REFLEX MICRO PNL UR: (no result)

## 2019-03-18 RX ADMIN — SODIUM CHLORIDE SCH MLS: 0.9 INJECTION, SOLUTION INTRAVENOUS at 13:08

## 2019-03-18 RX ADMIN — SODIUM CHLORIDE SCH MLS: 0.9 INJECTION, SOLUTION INTRAVENOUS at 02:15

## 2019-03-18 RX ADMIN — METRONIDAZOLE SCH MLS: 500 INJECTION, SOLUTION INTRAVENOUS at 05:26

## 2019-03-18 RX ADMIN — LEVOFLOXACIN SCH MLS: 5 INJECTION, SOLUTION INTRAVENOUS at 09:15

## 2019-03-18 RX ADMIN — Medication SCH: at 20:21

## 2019-03-18 RX ADMIN — SODIUM CHLORIDE SCH: 0.9 INJECTION, SOLUTION INTRAVENOUS at 20:21

## 2019-03-18 RX ADMIN — ENOXAPARIN SODIUM SCH MG: 40 INJECTION SUBCUTANEOUS at 09:17

## 2019-03-18 RX ADMIN — METRONIDAZOLE SCH MLS: 500 INJECTION, SOLUTION INTRAVENOUS at 18:10

## 2019-03-18 RX ADMIN — Medication SCH ML: at 09:19

## 2019-03-18 RX ADMIN — METRONIDAZOLE SCH MLS: 500 INJECTION, SOLUTION INTRAVENOUS at 13:10

## 2019-03-18 NOTE — P.HP
Certification for Inpatient


Patient admitted to: Observation


With expected LOS: <2 Midnights


Patient will require the following post-hospital care: None


Practitioner: I am a practitioner with admitting privileges, knowledge of 

patient current condition, hospital course, and medical plan of care.


Services: Services provided to patient in accordance with Admission 

requirements found in Title 42 Section 412.3 of the Code of Federal Regulations





Patient History


Date of Service: 03/18/19


Reason for admission: Abdominal pain/persistent diarrhea/ketosis


History of Present Illness: 





Patient is a 33-year-old female came into the hospital with abdominal pain and  

severe dehydration  Patient's workup revealed she was very ketotic. She had a 

sore throat a few weeks ago, and she was placed on antibiotics.  She went to 

[place] a week ago, and then a couple of days ago she started having diarrhea. 

Will do stool studies, and we will do a hepatitis profile. She will need 

admission for observation. 


Allergies





No Known Drug Allergies Allergy (Verified 03/18/19 02:04)


 Unknown





Home Medications: 








Levonorgestrel-Ethin Estradiol [Lillow-28 Tablet] 1 tab PO DAILY 03/18/19 








- Past Medical/Surgical History


Has patient received pneumonia vaccine in the past: No


Diabetic: No


Past Medical History: Patient denies medical history


Past Surgical History: Patient denies surgical history





- Family History


  ** Father


Notes: father has no medical condition





  ** Mother


History Unknown: Yes





- Social History


Smoking Status: Never smoker


Alcohol use: No


CD- Drugs: No


Caffeine use: Yes


Place of Residence: Home





Review of Systems


10-point ROS is otherwise unremarkable





Physical Examination





- Vital Signs


Temperature: 97.8 F


Blood Pressure: 110/68


Pulse: 73


Respirations: 14


Pulse Ox (%): 99





- Physical Exam


General: Alert, In no apparent distress, Oriented x3


HEENT: Atraumatic, PERRLA, Mucous membr. moist/pink, EOMI, Sclerae nonicteric


Neck: Supple, 2+ carotid pulse no bruit, No LAD, Without JVD or thyroid 

abnormality


Respiratory: Clear to auscultation bilaterally, Normal air movement


Cardiovascular: Regular rate/rhythm, Normal S1 S2, No murmurs


Gastrointestinal: Normal bowel sounds, Soft and benign, Non-distended, No 

rebound, No guarding, Tenderness


Musculoskeletal: No clubbing, No swelling, No tenderness


Integumentary: No rashes


Neurological: Normal speech, Normal tone, Sensation intact, Cranial nerves 3-12 

intact, Normal affect, Abnormal gait, Abnormal strength


Lymphatics: No axilla or inguinal lymphadenopathy





- Studies


Laboratory Data (last 24 hrs)





03/17/19 22:45: WBC 10.2  D, Hgb 14.1, Hct 41.2, Plt Count 368


03/17/19 22:45: Sodium 139, Potassium 3.3 L, BUN 6 L, Creatinine 0.69, Glucose 

108 H, Total Bilirubin 0.5, AST 12 L, ALT 20, Alkaline Phosphatase 55, Lipase 

100








Assessment & Plan





- Problems (Diagnosis)


(1) Colitis


Current Visit: Yes   Status: Acute   





(2) Diarrhea


Current Visit: Yes   Status: Acute   





(3) Abdominal pain


Current Visit: Yes   Status: Acute   





- Plan





1. Continue with IV hydration 


2. Continue with IV antibiotics


3. Continue with pain control


4. NPO


5. GI consultation; outpatient colonoscopy in 6-12 weeks


6. Serial H&H, and we will monitor CBC, BMP, LFTs and lipase along with 

electrolytes.


7. GI and DVT prophylaxis


Discharge Plan: Home


Plan to discharge in: 48 Hours





- Advance Directives


Does patient have a Living Will: No


Does patient have a Durable POA for Healthcare: No





- Code Status/Comfort Care


Code Status Assessed: Yes


Code Status: Full Code


Critical Care: No


Time Spent Managing PTS Care (In Minutes): 45

## 2019-03-19 LAB
ALBUMIN SERPL BCP-MCNC: 3 G/DL (ref 3.4–5)
ALP SERPL-CCNC: 40 U/L (ref 45–117)
ALT SERPL W P-5'-P-CCNC: 19 U/L (ref 12–78)
AST SERPL W P-5'-P-CCNC: 16 U/L (ref 15–37)
BUN BLD-MCNC: 3 MG/DL (ref 7–18)
GLUCOSE SERPLBLD-MCNC: 134 MG/DL (ref 74–106)
HCT VFR BLD CALC: 37.5 % (ref 36–45)
LYMPHOCYTES # SPEC AUTO: 1 K/UL (ref 0.7–4.9)
PMV BLD: 8.3 FL (ref 7.6–11.3)
POTASSIUM SERPL-SCNC: 3.6 MMOL/L (ref 3.5–5.1)
RBC # BLD: 4.07 M/UL (ref 3.86–4.86)

## 2019-03-19 RX ADMIN — ENOXAPARIN SODIUM SCH MG: 40 INJECTION SUBCUTANEOUS at 08:17

## 2019-03-19 RX ADMIN — Medication SCH: at 08:17

## 2019-03-19 RX ADMIN — METRONIDAZOLE SCH MLS: 500 INJECTION, SOLUTION INTRAVENOUS at 05:42

## 2019-03-19 RX ADMIN — SODIUM CHLORIDE SCH MLS: 0.9 INJECTION, SOLUTION INTRAVENOUS at 00:30

## 2019-03-19 RX ADMIN — SODIUM CHLORIDE SCH: 0.9 INJECTION, SOLUTION INTRAVENOUS at 06:19

## 2019-03-19 RX ADMIN — METRONIDAZOLE SCH MLS: 500 INJECTION, SOLUTION INTRAVENOUS at 00:29

## 2019-03-19 RX ADMIN — METRONIDAZOLE SCH MLS: 500 INJECTION, SOLUTION INTRAVENOUS at 11:18

## 2019-03-19 RX ADMIN — LEVOFLOXACIN SCH MLS: 5 INJECTION, SOLUTION INTRAVENOUS at 08:17

## 2019-03-20 NOTE — DS
Date of Discharge:  03/19/2019



Discharge Diagnoses:  

1.   Acute colitis.

2.   Diarrhea.

3.   Abdominal pain.

4.   hepatic lesion

Hospital Course:  The patient is a 33-year-old female, who was in the ER a 
couple of days prior to admission, came in with abdominal pain after a trip to 
[place], having diarrhea.  The patient had CT scan of the abdomen done, which 
showed moderately severe ascending colitis and incidental finding of an 8-mm 
hypodensity in the left lobe of the liver, probably benign.  Hepatitis panel 
was obtained, however will not result for the next few days.  The patient 
overall did well over the course of the hospital stay.  Her UA was equivocal.  
She did not have any symptoms.  Her urine culture showed no growth.  Her C. 
diff assay was negative.  Fecal leukocyte stain did show no WBCs.  Her stool 
cultures showed reduced priscilla.  Blood cultures did not show any growth.  Her 
white blood cell count normalized.  She tolerated antibiotics well.  Her 
electrolytes were corrected.  The patient was then cleared for discharge and 
was sent home in a stable condition.



Activity:  As tolerated.



Medications:  As per medication reconciliation list.



Discharge Instructions:  Finish up course of Cipro and Flagyl.  Return to ER 
for worsening condition.  Establish care with GI in 2-4 weeks.



Diet:  [place] for the next few days.



Physical Examination:

General:  Awake, alert, and oriented x3.  No acute distress. 

CV:  S1, S2.  No murmurs. 

Respiratory:  Moving air well bilaterally.  No wheezing. 

Gastrointestinal:  Abdomen is soft, nontender, and nondistended.  Positive 
bowel sounds. 

Extremities:  No clubbing, cyanosis, or edema. 

Neurologic:  Nonfocal.





SA/MODL

DD:  03/19/2019 16:34:04   Voice ID:  [id number]

DT:  03/20/2019 04:44:01   Report ID:  [id number]

[facility]